# Patient Record
Sex: MALE
[De-identification: names, ages, dates, MRNs, and addresses within clinical notes are randomized per-mention and may not be internally consistent; named-entity substitution may affect disease eponyms.]

---

## 2019-09-06 PROBLEM — Z00.00 ENCOUNTER FOR PREVENTIVE HEALTH EXAMINATION: Status: ACTIVE | Noted: 2019-09-06

## 2019-09-13 ENCOUNTER — APPOINTMENT (OUTPATIENT)
Dept: ORTHOPEDIC SURGERY | Facility: CLINIC | Age: 29
End: 2019-09-13
Payer: COMMERCIAL

## 2019-09-13 VITALS
WEIGHT: 160 LBS | HEART RATE: 90 BPM | HEIGHT: 70 IN | SYSTOLIC BLOOD PRESSURE: 110 MMHG | BODY MASS INDEX: 22.9 KG/M2 | OXYGEN SATURATION: 99 % | DIASTOLIC BLOOD PRESSURE: 70 MMHG

## 2019-09-13 DIAGNOSIS — M16.9 OSTEOARTHRITIS OF HIP, UNSPECIFIED: ICD-10-CM

## 2019-09-13 DIAGNOSIS — Z78.9 OTHER SPECIFIED HEALTH STATUS: ICD-10-CM

## 2019-09-13 DIAGNOSIS — M25.569 PAIN IN UNSPECIFIED KNEE: ICD-10-CM

## 2019-09-13 PROCEDURE — 99203 OFFICE O/P NEW LOW 30 MIN: CPT

## 2019-09-23 PROBLEM — M16.9 LABRAL TEAR OF HIP, DEGENERATIVE: Status: ACTIVE | Noted: 2019-09-23

## 2019-09-23 PROBLEM — M25.569 KNEE PAIN, UNSPECIFIED CHRONICITY, UNSPECIFIED LATERALITY: Status: ACTIVE | Noted: 2019-09-13

## 2019-09-23 NOTE — HISTORY OF PRESENT ILLNESS
[de-identified] : Mr Rogers is an active 28 yo gentleman with ongoing right knee and left hip issues for the last year. He sustained an MCL sprain in his right knee while [place] in [place] of [place].He was able to recover and denies any meidal knee pain. he does have intermittent anterior right knee pain and clicking. he also reports left groin pain and clicking which began after he altered his gait as he recovered from his knee injury. He has has supervised PT and MRIs of both the knee and the hip. His knee is asymptomatic at the moment but his hip is constantly painful.

## 2019-09-23 NOTE — DISCUSSION/SUMMARY
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 he will see Dr Stevenson for his hip issues. He will continue on with activities as tolerated. All questions were answered. He will call if any issues arise.

## 2019-09-23 NOTE — END OF VISIT
[FreeTextEntry3] : All medical record entries made by CHRISTI Erickson, acting as a scribe for this encounter under the direction of Guerrero Darden MD . I have reviewed the chart and agree that the record accurately reflects my personal performance of the history, physical exam, assessment and plan. I have also personally directed, reviewed, and agreed with the chart.
